# Patient Record
Sex: MALE | URBAN - METROPOLITAN AREA
[De-identification: names, ages, dates, MRNs, and addresses within clinical notes are randomized per-mention and may not be internally consistent; named-entity substitution may affect disease eponyms.]

---

## 2024-07-29 ENCOUNTER — APPOINTMENT (RX ONLY)
Dept: URBAN - METROPOLITAN AREA CLINIC 4 | Facility: CLINIC | Age: 54
Setting detail: DERMATOLOGY
End: 2024-07-29

## 2024-07-29 DIAGNOSIS — Z00.00 ENCOUNTER FOR GENERAL ADULT MEDICAL EXAMINATION WITHOUT ABNORMAL FINDINGS: ICD-10-CM

## 2024-07-29 DIAGNOSIS — L21.8 OTHER SEBORRHEIC DERMATITIS: ICD-10-CM | Status: INADEQUATELY CONTROLLED

## 2024-07-29 PROBLEM — D23.71 OTHER BENIGN NEOPLASM OF SKIN OF RIGHT LOWER LIMB, INCLUDING HIP: Status: ACTIVE | Noted: 2024-07-29

## 2024-07-29 PROCEDURE — ? PRESCRIPTION

## 2024-07-29 PROCEDURE — ? DIAGNOSIS COMMENT

## 2024-07-29 PROCEDURE — ? COUNSELING

## 2024-07-29 PROCEDURE — ? PRESCRIPTION MEDICATION MANAGEMENT

## 2024-07-29 PROCEDURE — 99204 OFFICE O/P NEW MOD 45 MIN: CPT

## 2024-07-29 RX ORDER — KETOCONAZOLE 20 MG/G
CREAM TOPICAL QD
Qty: 30 | Refills: 2 | Status: ERX | COMMUNITY
Start: 2024-07-29

## 2024-07-29 RX ADMIN — KETOCONAZOLE: 20 CREAM TOPICAL at 00:00

## 2024-07-29 ASSESSMENT — LOCATION ZONE DERM: LOCATION ZONE: FACE

## 2024-07-29 ASSESSMENT — LOCATION SIMPLE DESCRIPTION DERM
LOCATION SIMPLE: LEFT CHEEK
LOCATION SIMPLE: RIGHT FOREHEAD

## 2024-07-29 ASSESSMENT — LOCATION DETAILED DESCRIPTION DERM
LOCATION DETAILED: LEFT CENTRAL MALAR CHEEK
LOCATION DETAILED: RIGHT MEDIAL FOREHEAD

## 2024-07-29 NOTE — PROCEDURE: PRESCRIPTION MEDICATION MANAGEMENT
Render In Strict Bullet Format?: No
Detail Level: Zone
Initiate Treatment: ketoconazole 2 % topical cream QD\\nQuantity: 30.0 g  Days Supply: 30\\nSig: Apply daily to rash on face 2-3 x weekly PRN flares.

## 2024-07-29 NOTE — HPI: SKIN LESION
What Type Of Note Output Would You Prefer (Optional)?: Standard Output
How Severe Is Your Skin Lesion?: mild
Has Your Skin Lesion Been Treated?: not been treated
Is This A New Presentation, Or A Follow-Up?: Skin Lesion
Additional History: Today lesion is mostly resolved.

## 2024-07-29 NOTE — PROCEDURE: DIAGNOSIS COMMENT
Detail Level: Simple
Comment: No active lesion present today. RTC if recurs for proper evaluation
Render Risk Assessment In Note?: no